# Patient Record
Sex: FEMALE | Race: WHITE | URBAN - NONMETROPOLITAN AREA
[De-identification: names, ages, dates, MRNs, and addresses within clinical notes are randomized per-mention and may not be internally consistent; named-entity substitution may affect disease eponyms.]

---

## 2023-04-17 ENCOUNTER — OFFICE VISIT (OUTPATIENT)
Dept: PRIMARY CARE CLINIC | Age: 23
End: 2023-04-17
Payer: MEDICAID

## 2023-04-17 VITALS
HEART RATE: 127 BPM | DIASTOLIC BLOOD PRESSURE: 70 MMHG | SYSTOLIC BLOOD PRESSURE: 118 MMHG | TEMPERATURE: 98.3 F | WEIGHT: 208.5 LBS | BODY MASS INDEX: 31.6 KG/M2 | OXYGEN SATURATION: 98 % | HEIGHT: 68 IN

## 2023-04-17 DIAGNOSIS — Z20.818 EXPOSURE TO STREP THROAT: ICD-10-CM

## 2023-04-17 DIAGNOSIS — H10.32 ACUTE CONJUNCTIVITIS OF LEFT EYE, UNSPECIFIED ACUTE CONJUNCTIVITIS TYPE: ICD-10-CM

## 2023-04-17 DIAGNOSIS — J06.9 VIRAL URI: Primary | ICD-10-CM

## 2023-04-17 LAB — S PYO AG THROAT QL: NORMAL

## 2023-04-17 PROCEDURE — 87880 STREP A ASSAY W/OPTIC: CPT | Performed by: NURSE PRACTITIONER

## 2023-04-17 PROCEDURE — 99203 OFFICE O/P NEW LOW 30 MIN: CPT | Performed by: NURSE PRACTITIONER

## 2023-04-17 RX ORDER — TOBRAMYCIN 3 MG/ML
1 SOLUTION/ DROPS OPHTHALMIC EVERY 4 HOURS
Qty: 1 EACH | Refills: 0 | Status: SHIPPED | OUTPATIENT
Start: 2023-04-17 | End: 2023-04-24

## 2023-04-17 ASSESSMENT — ENCOUNTER SYMPTOMS
EYE REDNESS: 1
NAUSEA: 0
SINUS PRESSURE: 0
RHINORRHEA: 0
COUGH: 1
CONSTIPATION: 0
ABDOMINAL PAIN: 0
WHEEZING: 0
SHORTNESS OF BREATH: 0
SORE THROAT: 1
BLOOD IN STOOL: 0
VOMITING: 0
COLOR CHANGE: 0
EYE ITCHING: 1
DIARRHEA: 0
EYE DISCHARGE: 1
PHOTOPHOBIA: 1

## 2023-04-17 NOTE — PROGRESS NOTES
[address] [facility] [address] [facility] [address] [address]  Dept: [phone]  Dept Fax: [phone]  Loc: [phone]    Adi Diaz is a 25 y.o. female who presents today for her medical conditions/complaints as noted below. Adi Diaz is complaining of Conjunctivitis (Possible pink eye, the left eye is red and watery ) and Cough (Was exposed to strep )        HPI:   Conjunctivitis   The current episode started yesterday. The onset was sudden. The problem occurs continuously. The problem has been gradually worsening. The problem is mild. Nothing relieves the symptoms. Nothing aggravates the symptoms. Associated symptoms include eye itching, photophobia, congestion, sore throat, cough, eye discharge (white, purulent) and eye redness. Pertinent negatives include no fever, no abdominal pain, no constipation, no diarrhea, no nausea, no vomiting, no ear pain, no headaches, no rhinorrhea, no wheezing and no rash. Cough  This is a new problem. The current episode started yesterday. The problem has been waxing and waning. The problem occurs every few minutes. Associated symptoms include eye redness and a sore throat. Pertinent negatives include no chest pain, chills, ear pain, fever, headaches, myalgias, rash, rhinorrhea, shortness of breath or wheezing. The symptoms are aggravated by lying down. She has tried nothing for the symptoms. No known COVID or flu exposure recently. Positive strep exposure    No past medical history on file. No past surgical history on file. No family history on file.     Social History     Tobacco Use    Smoking status: Never    Smokeless tobacco: Never   Substance Use Topics    Alcohol use: Not on file        Current Outpatient Medications   Medication Sig Dispense Refill    tobramycin (TOBREX) 0.3 % ophthalmic solution Place 1 drop into the left eye every 4 hours for 7 days 1 each 0     No current

## 2023-04-17 NOTE — PATIENT INSTRUCTIONS
- Use Tobrex as prescribed  - Recommended warm, moist compresses three to five times per day   - Wash hands frequently  - If applicable, discontinue eye makeup to support healing. Recommended supportive care:  - Increase fluid intake  - Encouraged adequate rest  - Recommended OTC claritin or zyrtec and flonase  - Take OTC motrin/tylenol for fevers/body aches  - Stay home until at least 24 hours fever free without medications. - Monitor for signs of dehydration: decreased urine output, dark urine, feeling weak or dizzy, and go to the ER if these occur.   - The patient is to follow up with PCP or return to clinic if symptoms worsen/fail to improve.

## 2023-04-18 ENCOUNTER — OFFICE VISIT (OUTPATIENT)
Dept: FAMILY MEDICINE CLINIC | Age: 23
End: 2023-04-18
Payer: MEDICAID

## 2023-04-18 VITALS
HEART RATE: 133 BPM | WEIGHT: 205 LBS | SYSTOLIC BLOOD PRESSURE: 120 MMHG | TEMPERATURE: 97.9 F | DIASTOLIC BLOOD PRESSURE: 74 MMHG | OXYGEN SATURATION: 97 % | BODY MASS INDEX: 31.07 KG/M2 | HEIGHT: 68 IN

## 2023-04-18 DIAGNOSIS — R19.7 DIARRHEA, UNSPECIFIED TYPE: ICD-10-CM

## 2023-04-18 DIAGNOSIS — R10.9 ABDOMINAL CRAMPING: Primary | ICD-10-CM

## 2023-04-18 DIAGNOSIS — H44.002 EYE INFECTION, LEFT: ICD-10-CM

## 2023-04-18 PROCEDURE — 99203 OFFICE O/P NEW LOW 30 MIN: CPT | Performed by: NURSE PRACTITIONER

## 2023-04-18 RX ORDER — CEFPROZIL 500 MG/1
500 TABLET, FILM COATED ORAL 2 TIMES DAILY
Qty: 20 TABLET | Refills: 0 | Status: SHIPPED | OUTPATIENT
Start: 2023-04-18 | End: 2023-04-28

## 2023-04-18 ASSESSMENT — ENCOUNTER SYMPTOMS
SHORTNESS OF BREATH: 0
VOMITING: 0
ABDOMINAL PAIN: 1
COUGH: 0
EYE PAIN: 1
TROUBLE SWALLOWING: 0
NAUSEA: 0
EYE DISCHARGE: 1
DIARRHEA: 1

## 2023-04-18 NOTE — PROGRESS NOTES
Patient ID: Katty Cross is a 25 y. o.female. Conjunctivitis  HPI:   HPI   Vear Horse today is lightheaded. Having diarrhea. No fever   Left eye that is red swollen and painful. Dizzy lightheaded. Ms. Talita Cannon is a new patient that is in the area from [place] she has not transferred all of her healthcare over to the [facility] and does not have a Medicaid card but probably will be granted she is 18 weeks pregnant she also stated that she thought she had a bladder infection but that she was not treated in [place] for this she comes in today having diarrhea no fever that she is aware of abdominal type cramping her left is very swollen red and painful with watery type drainage she says it has gotten worse since she was seen yesterday in which they gave her Tobrex in order to treat the conjunctivitis however I feel like there is something more that is going on on obtain blood work and labs and I have requested that she go see the ophthalmologist to have them take a look at it she is worried a little bit about cost although I believe Medicaid will back pay 30 days when she is instituted  History reviewed. No pertinent past medical history. Prior to Visit Medications    Medication Sig Taking? Authorizing Provider   cefPROZIL (CEFZIL) 500 MG tablet Take 1 tablet by mouth 2 times daily for 10 days Yes ROCK Jean   tobramycin (TOBREX) 0.3 % ophthalmic solution Place 1 drop into the left eye every 4 hours for 7 days  Patient not taking: Reported on 4/18/2023  ROCK Trevino - CNP     No Known Allergies  No past surgical history on file. No family history on file.   Social History     Socioeconomic History    Marital status: Single     Spouse name: Not on file    Number of children: Not on file    Years of education: Not on file    Highest education level: Not on file   Occupational History    Not on file   Tobacco Use    Smoking status: Never    Smokeless tobacco: Never   Substance and

## 2023-04-19 ENCOUNTER — TELEPHONE (OUTPATIENT)
Dept: FAMILY MEDICINE CLINIC | Age: 23
End: 2023-04-19

## 2023-04-19 DIAGNOSIS — R82.90 ABNORMAL URINALYSIS: ICD-10-CM

## 2023-04-19 DIAGNOSIS — R10.9 ABDOMINAL CRAMPING: ICD-10-CM

## 2023-04-19 DIAGNOSIS — R10.9 ABDOMINAL CRAMPING: Primary | ICD-10-CM

## 2023-04-19 LAB
CREAT UR-MCNC: 204.1 MG/DL (ref 4.2–622)
MICROALBUMIN UR-MCNC: <1.2 MG/DL (ref 0–19)
MICROALBUMIN/CREAT UR-RTO: NORMAL MG/G

## 2023-04-19 NOTE — TELEPHONE ENCOUNTER
I called pt to let her know that I have placed order for kidney ultrasound. I gave her the number to call to get that scheduled. She thanked me and voiced understanding.

## 2023-04-20 ENCOUNTER — TELEPHONE (OUTPATIENT)
Dept: FAMILY MEDICINE CLINIC | Age: 23
End: 2023-04-20

## 2023-04-20 DIAGNOSIS — R10.9 ABDOMINAL CRAMPING: Primary | ICD-10-CM

## 2023-04-20 DIAGNOSIS — Z3A.18 18 WEEKS GESTATION OF PREGNANCY: ICD-10-CM

## 2023-04-20 NOTE — TELEPHONE ENCOUNTER
----- Message from ROCK Fernandez sent at 4/20/2023  7:35 AM CDT -----  I think we need to get her set up with OB ASAP

## 2023-04-20 NOTE — TELEPHONE ENCOUNTER
----- Message from ROCK Max sent at 4/20/2023  7:35 AM CDT -----  I think we need to get her set up with OB ASAP

## 2023-04-20 NOTE — TELEPHONE ENCOUNTER
I called pt and notified her of results and told her I have placed OB referral. She thanked me and voiced understanding.

## 2023-04-20 NOTE — TELEPHONE ENCOUNTER
I tried to call patient to go over her results, but I was unable to leave a message because her voicemail box was full. Referral to OBGYN has been placed.

## 2023-04-24 ENCOUNTER — TELEPHONE (OUTPATIENT)
Dept: OBGYN CLINIC | Age: 23
End: 2023-04-24

## 2023-04-24 NOTE — TELEPHONE ENCOUNTER
PT called to reschedule past cancelled appt, She is 20 weeks  1 day, has cramping, has to schedule for next Monday May 1 due to Insurance, I could not accommodate, reached out to office , she had to hang up. Please call .

## 2023-04-24 NOTE — TELEPHONE ENCOUNTER
Tried to reach out to patient, when she answered she informed me she was on the matthias with insurance and would return call when she was done.      TOÑA Mcknight

## 2024-05-06 ENCOUNTER — OFFICE VISIT (OUTPATIENT)
Dept: FAMILY MEDICINE CLINIC | Facility: CLINIC | Age: 24
End: 2024-05-06
Payer: COMMERCIAL

## 2024-05-06 VITALS
OXYGEN SATURATION: 98 % | HEIGHT: 70 IN | DIASTOLIC BLOOD PRESSURE: 70 MMHG | SYSTOLIC BLOOD PRESSURE: 108 MMHG | HEART RATE: 101 BPM | BODY MASS INDEX: 33.07 KG/M2 | TEMPERATURE: 97 F | WEIGHT: 231 LBS | RESPIRATION RATE: 18 BRPM

## 2024-05-06 DIAGNOSIS — K62.5 RECTAL BLEEDING: ICD-10-CM

## 2024-05-06 DIAGNOSIS — R73.01 IMPAIRED FASTING GLUCOSE: ICD-10-CM

## 2024-05-06 DIAGNOSIS — Z76.89 ENCOUNTER TO ESTABLISH CARE: Primary | ICD-10-CM

## 2024-05-06 DIAGNOSIS — Z79.899 ENCOUNTER FOR LONG-TERM (CURRENT) USE OF OTHER MEDICATIONS: ICD-10-CM

## 2024-05-06 DIAGNOSIS — E78.00 ELEVATED LDL CHOLESTEROL LEVEL: ICD-10-CM

## 2024-05-06 DIAGNOSIS — R53.82 CHRONIC FATIGUE: ICD-10-CM

## 2024-05-06 DIAGNOSIS — K64.8 INTERNAL HEMORRHOIDS: ICD-10-CM

## 2024-05-06 DIAGNOSIS — E66.09 CLASS 1 OBESITY DUE TO EXCESS CALORIES WITHOUT SERIOUS COMORBIDITY WITH BODY MASS INDEX (BMI) OF 33.0 TO 33.9 IN ADULT: ICD-10-CM

## 2024-05-06 DIAGNOSIS — Z11.59 ENCOUNTER FOR HEPATITIS C SCREENING TEST FOR LOW RISK PATIENT: ICD-10-CM

## 2024-05-06 PROCEDURE — 99203 OFFICE O/P NEW LOW 30 MIN: CPT | Performed by: NURSE PRACTITIONER

## 2024-05-06 PROCEDURE — 1159F MED LIST DOCD IN RCRD: CPT | Performed by: NURSE PRACTITIONER

## 2024-05-06 PROCEDURE — 1160F RVW MEDS BY RX/DR IN RCRD: CPT | Performed by: NURSE PRACTITIONER

## 2024-05-06 RX ORDER — HYDROCORTISONE 25 MG/G
CREAM TOPICAL 2 TIMES DAILY
Qty: 28 G | Refills: 3 | Status: SHIPPED | OUTPATIENT
Start: 2024-05-06

## 2024-05-07 ENCOUNTER — PATIENT ROUNDING (BHMG ONLY) (OUTPATIENT)
Dept: FAMILY MEDICINE CLINIC | Facility: CLINIC | Age: 24
End: 2024-05-07
Payer: COMMERCIAL

## 2024-05-07 ENCOUNTER — TELEPHONE (OUTPATIENT)
Dept: FAMILY MEDICINE CLINIC | Facility: CLINIC | Age: 24
End: 2024-05-07

## 2024-05-07 NOTE — TELEPHONE ENCOUNTER
Caller: Raina Pike    Relationship: Self    Best call back number:     [phone] (Home)       Who are you requesting to speak with (clinical staff, provider,  specific staff member): CLINICAL    What was the call regarding: THE PATIENT STATES THAT SHE IS STILL BLEEDING AND CANNOT GET INTO THE SPECIALIST UNTIL 6/20/24.  PLEASE ADVISE.

## 2024-05-07 NOTE — TELEPHONE ENCOUNTER
Spoke with Patient and relayed per provider has given medication. Contacting GI that if they have cancellation she would like to get into office sooner. Verbally understood

## 2024-05-15 PROBLEM — K62.5 RECTAL BLEEDING: Status: ACTIVE | Noted: 2024-05-15

## 2024-08-07 ENCOUNTER — TELEPHONE (OUTPATIENT)
Dept: GASTROENTEROLOGY | Facility: CLINIC | Age: 24
End: 2024-08-07
Payer: COMMERCIAL

## 2024-08-07 PROBLEM — Z83.79 FAMILY HISTORY OF ULCERATIVE COLITIS: Status: ACTIVE | Noted: 2024-08-07

## 2024-08-07 NOTE — TELEPHONE ENCOUNTER
Patient had appointment with Alison Lynn at 10:45. Tried calling to see if she needed to reschedule but phone said not available. Left message with emergency contact.

## 2025-04-10 ENCOUNTER — OFFICE VISIT (OUTPATIENT)
Age: 25
End: 2025-04-10
Payer: MEDICAID

## 2025-04-10 VITALS
BODY MASS INDEX: 33.36 KG/M2 | DIASTOLIC BLOOD PRESSURE: 70 MMHG | WEIGHT: 233 LBS | HEART RATE: 113 BPM | TEMPERATURE: 97.6 F | SYSTOLIC BLOOD PRESSURE: 110 MMHG | HEIGHT: 70 IN | OXYGEN SATURATION: 97 % | RESPIRATION RATE: 18 BRPM

## 2025-04-10 DIAGNOSIS — K08.89 TOOTH PAIN: ICD-10-CM

## 2025-04-10 DIAGNOSIS — K04.7 TOOTH INFECTION: Primary | ICD-10-CM

## 2025-04-10 PROCEDURE — 99213 OFFICE O/P EST LOW 20 MIN: CPT

## 2025-04-10 RX ORDER — KETOROLAC TROMETHAMINE 30 MG/ML
30 INJECTION, SOLUTION INTRAMUSCULAR; INTRAVENOUS ONCE
Status: COMPLETED | OUTPATIENT
Start: 2025-04-10 | End: 2025-04-10

## 2025-04-10 RX ORDER — CEFTRIAXONE 1 G/1
1000 INJECTION, POWDER, FOR SOLUTION INTRAMUSCULAR; INTRAVENOUS ONCE
Status: COMPLETED | OUTPATIENT
Start: 2025-04-10 | End: 2025-04-10

## 2025-04-10 RX ADMIN — CEFTRIAXONE 1000 MG: 1 INJECTION, POWDER, FOR SOLUTION INTRAMUSCULAR; INTRAVENOUS at 14:41

## 2025-04-10 RX ADMIN — KETOROLAC TROMETHAMINE 30 MG: 30 INJECTION, SOLUTION INTRAMUSCULAR; INTRAVENOUS at 14:42

## 2025-04-10 ASSESSMENT — ENCOUNTER SYMPTOMS
VOMITING: 0
APNEA: 0
CHEST TIGHTNESS: 0
EYE PAIN: 0
SINUS PAIN: 0
WHEEZING: 0
EYE DISCHARGE: 0
CONSTIPATION: 0
ABDOMINAL DISTENTION: 0
TROUBLE SWALLOWING: 0
SHORTNESS OF BREATH: 0
RHINORRHEA: 0
SORE THROAT: 0
DIARRHEA: 0
COLOR CHANGE: 0
ABDOMINAL PAIN: 0
SINUS PRESSURE: 0
EYE ITCHING: 0
NAUSEA: 0
COUGH: 0

## 2025-04-10 NOTE — PATIENT INSTRUCTIONS
Abscessed Tooth    Continue Amoxicillin as prescribed.  Rocephin shot given in office.   Toradol shot given in office.   Continue follow up with oral surgeon at the end of May.  Brush and floss gently.  Reduce pain and swelling in your face and jaw by putting ice or a cold pack on the outside of your cheek. Do this for 10 to 20 minutes at a time. Put a thin cloth between the ice and your skin.  Avoid using tobacco products. Tobacco and nicotine slow your ability to heal.  Take pain medicines exactly as directed.  If the doctor gave you a prescription medicine for pain, take it as prescribed.  If you are not taking a prescription pain medicine, ask your doctor if you can take an over-the-counter medicine such as acetaminophen (Tylenol) or ibuprofen (Advil or Motrin). Be safe with medicines. Read and follow all instructions on the label.  Take antibiotics as directed. Do not stop taking them just because you feel better. You need to take the full course of antibiotics.  Return to the clinic or follow up with PCP if symptoms worsen or fail to improve.

## 2025-04-10 NOTE — PROGRESS NOTES
Medication was administered by Preston Ames MA at 2:43 PM.    Medication: ceftriaxone(rocephin)  Amount:  1000mg (2.86ml)  Route: intramuscular  Site: Dorsogluteal right    Medication: ketorolac tromethamine(toradol) 30mg   Amount: 30mg  Route: intramuscular  Site: Dorsogluteal left    Patient tolerated well.    
ill-appearing or toxic-appearing.   HENT:      Head: Normocephalic.      Jaw: Tenderness (right jaw) present.        Right Ear: Tympanic membrane, ear canal and external ear normal.      Left Ear: Tympanic membrane, ear canal and external ear normal.      Nose: Nose normal.      Mouth/Throat:      Lips: Pink.      Mouth: Mucous membranes are moist.      Dentition: Abnormal dentition. Dental tenderness, gingival swelling and dental abscesses present. No gum lesions.      Pharynx: Oropharynx is clear. No pharyngeal swelling, oropharyngeal exudate, posterior oropharyngeal erythema or postnasal drip.     Eyes:      General: Lids are normal.      Extraocular Movements: Extraocular movements intact.      Conjunctiva/sclera: Conjunctivae normal.      Pupils: Pupils are equal, round, and reactive to light.   Neck:      Trachea: Trachea normal.   Cardiovascular:      Rate and Rhythm: Regular rhythm. Tachycardia present.      Pulses: Normal pulses.      Heart sounds: Normal heart sounds, S1 normal and S2 normal. No murmur heard.  Pulmonary:      Effort: Pulmonary effort is normal. No accessory muscle usage or respiratory distress.      Breath sounds: Normal breath sounds and air entry. No stridor. No decreased breath sounds, wheezing or rhonchi.   Abdominal:      General: Abdomen is flat. Bowel sounds are normal. There is no distension.      Palpations: Abdomen is soft.      Tenderness: There is no abdominal tenderness. There is no guarding.   Musculoskeletal:         General: Normal range of motion.      Cervical back: Full passive range of motion without pain, normal range of motion and neck supple. No rigidity.      Right lower leg: No edema.      Left lower leg: No edema.   Lymphadenopathy:      Cervical: No cervical adenopathy.   Skin:     General: Skin is warm and dry.      Coloration: Skin is not cyanotic or pale.      Findings: No rash.   Neurological:      General: No focal deficit present.      Mental Status: She is